# Patient Record
Sex: MALE | Race: BLACK OR AFRICAN AMERICAN | NOT HISPANIC OR LATINO
[De-identification: names, ages, dates, MRNs, and addresses within clinical notes are randomized per-mention and may not be internally consistent; named-entity substitution may affect disease eponyms.]

---

## 2022-11-27 ENCOUNTER — NON-APPOINTMENT (OUTPATIENT)
Age: 52
End: 2022-11-27

## 2022-11-30 ENCOUNTER — NON-APPOINTMENT (OUTPATIENT)
Age: 52
End: 2022-11-30

## 2022-11-30 PROBLEM — Z00.00 ENCOUNTER FOR PREVENTIVE HEALTH EXAMINATION: Status: ACTIVE | Noted: 2022-11-30

## 2022-12-01 ENCOUNTER — APPOINTMENT (OUTPATIENT)
Dept: ORTHOPEDIC SURGERY | Facility: CLINIC | Age: 52
End: 2022-12-01
Payer: OTHER MISCELLANEOUS

## 2022-12-01 DIAGNOSIS — M23.91 UNSPECIFIED INTERNAL DERANGEMENT OF RIGHT KNEE: ICD-10-CM

## 2022-12-01 PROCEDURE — 99204 OFFICE O/P NEW MOD 45 MIN: CPT

## 2022-12-01 PROCEDURE — 73564 X-RAY EXAM KNEE 4 OR MORE: CPT | Mod: RT

## 2022-12-01 NOTE — DISCUSSION/SUMMARY
[de-identified] : I had a lengthy discussion with the patient regarding their current condition. We discussed the treatment options including operative and nonoperative management. At this time I recommended a supportive knee brace for his right knee. I recommended Voltaren gel for his right knee. He is referred to physical therapy. I recommend an MRI to rule out a medial meniscus tear. He will follow-up after this is completed. He remains out of work with a total temporary impairment. Patient is currently 100% disabled, although temporary. All questions were answered and the patient verbalized understanding. The patient is in agreement with this treatment plan.

## 2022-12-01 NOTE — DISCUSSION/SUMMARY
[de-identified] : I had a lengthy discussion with the patient regarding their current condition. We discussed the treatment options including operative and nonoperative management. At this time I recommended a supportive knee brace for his right knee. I recommended Voltaren gel for his right knee. He is referred to physical therapy. I recommend an MRI to rule out a medial meniscus tear. He will follow-up after this is completed. He remains out of work with a total temporary impairment. Patient is currently 100% disabled, although temporary. All questions were answered and the patient verbalized understanding. The patient is in agreement with this treatment plan.

## 2022-12-01 NOTE — ADDENDUM
[FreeTextEntry1] : Documented by Eleonora Dixon acting as a scribe for Dr. Allen and Koby Do PA-C on 12/01/2022.   All medical record entries made by the Scribe were at my, Dr. Allen, and Koby Do's, direction and personally dictated by me on 12/01/2022. I have reviewed the chart and agree that the record accurately reflects my personal performance of the history, physical exam, procedure and imaging.

## 2022-12-01 NOTE — PHYSICAL EXAM
[de-identified] : Physical Exam: \par General: Well appearing, no acute distress \par Neurologic: A&Ox3, No focal deficits \par Head: NCAT without abrasions, lacerations, or ecchymosis to head, face, or scalp \par Eyes: No scleral icterus, no gross abnormalities \par Respiratory: Equal chest wall expansion bilaterally, no accessory muscle use \par Lymphatic: No lymphadenopathy palpated \par Skin: Warm and dry \par Psychiatric: Normal mood and affect\par \par Examination of the right knee reveals no significant effusion, erythema or ecchymosis. There are no leg length discrepancies appreciated. The right knee is slightly larger than the left. The patient's range of motion is from 0-125° limited by pain with crepitus through the motion. The patient has no pain with patella mobility or apprehension. The patient displays tenderness to palpation in the medial and lateral joint lines but more so in the medial joint line, and maximally over the MCL. There is no patella facet tenderness. The patient has a 4.5 out of 5 strength resisted straight leg raising as well as knee flexion and extension. The knee is stable to Lachman testing, as well as anterior and posterior drawer. There is no valgus or varus instability. The patient has pain with Liliana and Grind Testing. The calf and thigh are soft, supple and nontender. The patient is grossly neurovascularly intact distally.\par \par Examination of the left knee reveals no significant effusion, erythema or ecchymosis. There are no leg length discrepancies appreciated. The patient's range of motion is from 0-130°. The knee is stable to Lachman testing, as well as anterior and posterior drawer. There is no valgus or varus instability. The calf and thigh are soft, supple and nontender. The patient is grossly neurovascularly intact distally.  [de-identified] : X-rays including 4 views of the right knee taken today in the office are reviewed. These reveal evidence of a prior tibial rodding. The knee joint demonstrates minimal arthritic change. There are no fractures or acute bony injuries.

## 2022-12-01 NOTE — PHYSICAL EXAM
[de-identified] : Physical Exam: \par General: Well appearing, no acute distress \par Neurologic: A&Ox3, No focal deficits \par Head: NCAT without abrasions, lacerations, or ecchymosis to head, face, or scalp \par Eyes: No scleral icterus, no gross abnormalities \par Respiratory: Equal chest wall expansion bilaterally, no accessory muscle use \par Lymphatic: No lymphadenopathy palpated \par Skin: Warm and dry \par Psychiatric: Normal mood and affect\par \par Examination of the right knee reveals no significant effusion, erythema or ecchymosis. There are no leg length discrepancies appreciated. The right knee is slightly larger than the left. The patient's range of motion is from 0-125° limited by pain with crepitus through the motion. The patient has no pain with patella mobility or apprehension. The patient displays tenderness to palpation in the medial and lateral joint lines but more so in the medial joint line, and maximally over the MCL. There is no patella facet tenderness. The patient has a 4.5 out of 5 strength resisted straight leg raising as well as knee flexion and extension. The knee is stable to Lachman testing, as well as anterior and posterior drawer. There is no valgus or varus instability. The patient has pain with Liliana and Grind Testing. The calf and thigh are soft, supple and nontender. The patient is grossly neurovascularly intact distally.\par \par Examination of the left knee reveals no significant effusion, erythema or ecchymosis. There are no leg length discrepancies appreciated. The patient's range of motion is from 0-130°. The knee is stable to Lachman testing, as well as anterior and posterior drawer. There is no valgus or varus instability. The calf and thigh are soft, supple and nontender. The patient is grossly neurovascularly intact distally.  [de-identified] : X-rays including 4 views of the right knee taken today in the office are reviewed. These reveal evidence of a prior tibial rodding. The knee joint demonstrates minimal arthritic change. There are no fractures or acute bony injuries.

## 2022-12-01 NOTE — HISTORY OF PRESENT ILLNESS
[Worsening] : worsening [de-identified] : Patient is 52 year year old, right hand dominant male who presents in office status post work related injury on 11/21/2022. The patient works as an [employer] . During the injury the patient injured his right knee. 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 He complains of deep knee pain that is persistent throughout the day. Over the last two weeks his symptoms increased where he was unable to walk or stand. The patient presents today wearing sneakers and is ambulating without assistance. He twisted his knee while in the van and has had worsening pain since. He reports his pain is at the inner knee.\par  [FreeTextEntry1] : Activities that make the pain better:\par Rest\par Ice\par Heat\par \par Activities that make pain worse:\par ADL's\par Lifting heavy objects\par Chores\par Work\par Sleeping\par Stairs\par Seating to standing position\par Walking\par \par He has a history of an IM rodding of his tibia 20+ years ago. He has not had any issues with this postop. \par \par He states the symptoms are worsening.

## 2022-12-02 ENCOUNTER — TRANSCRIPTION ENCOUNTER (OUTPATIENT)
Age: 52
End: 2022-12-02

## 2022-12-08 ENCOUNTER — OUTPATIENT (OUTPATIENT)
Dept: OUTPATIENT SERVICES | Facility: HOSPITAL | Age: 52
LOS: 1 days | End: 2022-12-08
Payer: COMMERCIAL

## 2022-12-08 ENCOUNTER — APPOINTMENT (OUTPATIENT)
Dept: MRI IMAGING | Facility: CLINIC | Age: 52
End: 2022-12-08

## 2022-12-08 DIAGNOSIS — M23.91 UNSPECIFIED INTERNAL DERANGEMENT OF RIGHT KNEE: ICD-10-CM

## 2022-12-08 DIAGNOSIS — S83.411A SPRAIN OF MEDIAL COLLATERAL LIGAMENT OF RIGHT KNEE, INITIAL ENCOUNTER: ICD-10-CM

## 2022-12-08 PROCEDURE — 73721 MRI JNT OF LWR EXTRE W/O DYE: CPT

## 2022-12-08 PROCEDURE — 73721 MRI JNT OF LWR EXTRE W/O DYE: CPT | Mod: 26,RT

## 2022-12-13 ENCOUNTER — APPOINTMENT (OUTPATIENT)
Dept: ORTHOPEDIC SURGERY | Facility: CLINIC | Age: 52
End: 2022-12-13

## 2022-12-13 RX ORDER — CALCITONIN SALMON 200 [IU]/1
200 SPRAY, METERED NASAL DAILY
Qty: 1 | Refills: 0 | Status: ACTIVE | COMMUNITY
Start: 2022-12-13 | End: 1900-01-01

## 2023-01-30 ENCOUNTER — APPOINTMENT (OUTPATIENT)
Dept: ORTHOPEDIC SURGERY | Facility: CLINIC | Age: 53
End: 2023-01-30
Payer: OTHER MISCELLANEOUS

## 2023-01-30 VITALS — BODY MASS INDEX: 34.1 KG/M2 | WEIGHT: 225 LBS | HEIGHT: 68 IN

## 2023-01-30 DIAGNOSIS — S80.01XA CONTUSION OF RIGHT KNEE, INITIAL ENCOUNTER: ICD-10-CM

## 2023-01-30 DIAGNOSIS — S83.411A SPRAIN OF MEDIAL COLLATERAL LIGAMENT OF RIGHT KNEE, INITIAL ENCOUNTER: ICD-10-CM

## 2023-01-30 DIAGNOSIS — S80.11XA CONTUSION OF RIGHT KNEE, INITIAL ENCOUNTER: ICD-10-CM

## 2023-01-30 PROCEDURE — 99214 OFFICE O/P EST MOD 30 MIN: CPT

## 2023-01-30 PROCEDURE — 99072 ADDL SUPL MATRL&STAF TM PHE: CPT

## 2023-01-30 NOTE — HISTORY OF PRESENT ILLNESS
[de-identified] : Patient returns today for follow-up evaluation of his Worker's Comp. injury to his right knee.  Patient states about a week or 2 ago he noticed complete symptomatic relief of his pain.  He works as an [employer] .  He had an MRI.  He denies instability.  He wishes to return to work full duty on Sunday.

## 2023-01-30 NOTE — PHYSICAL EXAM
[de-identified] : Physical Exam: \par General: Well appearing, no acute distress \par Neurologic: A&Ox3, No focal deficits \par Head: NCAT without abrasions, lacerations, or ecchymosis to head, face, or scalp \par Eyes: No scleral icterus, no gross abnormalities \par Respiratory: Equal chest wall expansion bilaterally, no accessory muscle use \par Lymphatic: No lymphadenopathy palpated \par Skin: Warm and dry \par Psychiatric: Normal mood and affect\par \par Right knee is examined and reveals no effusion or deformity.  There is no tenderness in the medial or lateral joint lines over the femoral-tibial condyles.  He has range of motion from 0 to 130 degrees pain-free and fluid.  No valgus varus instability or instability to Lachman testing.  His calf and thigh are soft and nontender.  He is grossly neurovascular intact distally [de-identified] : An MRI performed of the right knee without contrast on 12/8/2022 was reviewed.  This reveals an insufficiency fracture of the medial femoral condyle with associated marrow edema.  There are degenerative changes noted in the patellofemoral compartment with full-thickness chondral loss.  There is no evidence of meniscus tear appreciated.

## 2023-01-30 NOTE — DISCUSSION/SUMMARY
[de-identified] : I had a lengthy discussion with the patient regarding their current condition. We discussed the treatment options including operative and nonoperative management. At this time I recommended conservative management.  He can continue with a home exercise program.  I will allow him to return to work full duty on Sunday.  He will follow-up with us in 6 weeks for likely final evaluation.  I explained to him he does have some underlying osteoarthritis which may be an issue for him down the road.  I advised him on weight loss techniques as well as continuing home program.  All questions were answered.  He currently demonstrates a mild temporary impairment.